# Patient Record
Sex: MALE | Race: OTHER | Employment: FULL TIME | URBAN - METROPOLITAN AREA
[De-identification: names, ages, dates, MRNs, and addresses within clinical notes are randomized per-mention and may not be internally consistent; named-entity substitution may affect disease eponyms.]

---

## 2022-09-08 ENCOUNTER — OFFICE VISIT (OUTPATIENT)
Dept: FAMILY MEDICINE CLINIC | Age: 52
End: 2022-09-08
Payer: COMMERCIAL

## 2022-09-08 ENCOUNTER — HOSPITAL ENCOUNTER (OUTPATIENT)
Dept: LAB | Age: 52
Discharge: HOME | End: 2022-09-08

## 2022-09-08 VITALS
OXYGEN SATURATION: 98 % | HEIGHT: 70 IN | WEIGHT: 199.6 LBS | BODY MASS INDEX: 28.58 KG/M2 | RESPIRATION RATE: 16 BRPM | SYSTOLIC BLOOD PRESSURE: 123 MMHG | HEART RATE: 73 BPM | DIASTOLIC BLOOD PRESSURE: 79 MMHG | TEMPERATURE: 97.9 F

## 2022-09-08 DIAGNOSIS — K76.0 FATTY LIVER: Primary | ICD-10-CM

## 2022-09-08 DIAGNOSIS — R20.2 PARESTHESIA: ICD-10-CM

## 2022-09-08 DIAGNOSIS — Z00.00 ENCOUNTER FOR ROUTINE ADULT HEALTH EXAMINATION WITHOUT ABNORMAL FINDINGS: ICD-10-CM

## 2022-09-08 DIAGNOSIS — R73.03 PREDIABETES: ICD-10-CM

## 2022-09-08 DIAGNOSIS — R42 VERTIGO: ICD-10-CM

## 2022-09-08 DIAGNOSIS — Z76.89 ENCOUNTER TO ESTABLISH CARE: ICD-10-CM

## 2022-09-08 LAB — SENTARA SPECIMEN COL,SENBCF: NORMAL

## 2022-09-08 PROCEDURE — 99204 OFFICE O/P NEW MOD 45 MIN: CPT | Performed by: STUDENT IN AN ORGANIZED HEALTH CARE EDUCATION/TRAINING PROGRAM

## 2022-09-08 PROCEDURE — 99001 SPECIMEN HANDLING PT-LAB: CPT

## 2022-09-08 RX ORDER — ASPIRIN 81 MG/1
81 TABLET ORAL DAILY
Qty: 30 TABLET | Refills: 1 | Status: SHIPPED | OUTPATIENT
Start: 2022-09-08

## 2022-09-08 RX ORDER — MECLIZINE HYDROCHLORIDE 25 MG/1
25 TABLET ORAL
Qty: 30 TABLET | Refills: 0 | Status: SHIPPED | OUTPATIENT
Start: 2022-09-08 | End: 2022-09-18

## 2022-09-08 NOTE — PROGRESS NOTES
Jessica Garcia is a 47 y.o. presents today for   Chief Complaint   Patient presents with    Establish Care    Dizziness     Changes of position for a week      Is someone accompanying this pt? No    Is the patient using any DME equipment during OV? No    Visit Vitals  /79 (BP 1 Location: Left upper arm, BP Patient Position: Sitting, BP Cuff Size: Adult)   Pulse 73   Temp 97.9 °F (36.6 °C) (Temporal)   Resp 16   Ht 5' 9.69\" (1.77 m)   Wt 199 lb 9.6 oz (90.5 kg)   SpO2 98%   BMI 28.90 kg/m²       Depression Screening:   3 most recent PHQ Screens 9/8/2022   Little interest or pleasure in doing things Not at all   Feeling down, depressed, irritable, or hopeless Not at all   Total Score PHQ 2 0       Health Maintenance: reviewed and discussed and ordered per Provider. Health Maintenance Due   Topic Date Due    Hepatitis C Screening  Never done    Depression Screen  Never done    COVID-19 Vaccine (1) Never done    DTaP/Tdap/Td series (1 - Tdap) Never done    Lipid Screen  Never done    Colorectal Cancer Screening Combo  Never done    Shingrix Vaccine Age 50> (1 of 2) Never done    Flu Vaccine (1) Never done         Coordination of Care:   1. \"Have you been to the ER, urgent care clinic since your last visit? Hospitalized since your last visit? \" No    2. \"Have you seen or consulted any other health care providers outside of the [address] [place] [address] since your last visit? \" No     3. For patients aged 39-70: Has the patient had a colonoscopy / FIT/ Cologuard? Yes - no Care Gap present    If the patient is female:    4. For patients aged 41-77: Has the patient had a mammogram within the past 2 years? NA - based on age or sex    11. For patients aged 21-65: Has the patient had a pap smear?  NA - based on age or sex     Edwardo Drummond

## 2022-09-08 NOTE — PROGRESS NOTES
Deya Arthur is a 47 y.o.  male and presents with    Chief Complaint   Patient presents with   [facility] Establish Care    Dizziness     Changes of position for a week            Subjective:      Pt is here to establish care    He has been feeling like the room spins on him when he moves positions. Has been also feeling numbness on his feet b/l. Has been having a HA that are band like around his head for the last week. Pain is intermittent and worst at night. Has been taking Tylenol and seemed to have helped. All sx started about 1 week and were of sudden onset. Denies any acute events in the last week. Denies trauma. There is no problem list on file for this patient. History reviewed. No pertinent past medical history. History reviewed. No pertinent surgical history. Family History   Problem Relation Age of Onset    Diabetes Father      Social History     Socioeconomic History    Marital status:      Spouse name: Not on file    Number of children: Not on file    Years of education: Not on file    Highest education level: Not on file   Occupational History    Not on file   Tobacco Use    Smoking status: Never    Smokeless tobacco: Never   Vaping Use    Vaping Use: Never used   Substance and Sexual Activity    Alcohol use: Yes    Drug use: Never    Sexual activity: Yes     Partners: Female     Birth control/protection: None   Other Topics Concern    Not on file   Social History Narrative    Not on file     Social Determinants of Health     Financial Resource Strain: Not on file   Food Insecurity: Not on file   Transportation Needs: Not on file   Physical Activity: Not on file   Stress: Not on file   Social Connections: Not on file   Intimate Partner Violence: Not on file   Housing Stability: Not on file             ROS   Review of Systems   Constitutional:  Negative for chills, fever and malaise/fatigue. HENT:  Negative for congestion, ear discharge and ear pain.     Eyes: Negative for blurred vision, pain and discharge. Respiratory:  Negative for cough and shortness of breath. Cardiovascular:  Negative for chest pain and palpitations. Gastrointestinal:  Negative for abdominal pain, nausea and vomiting. Genitourinary:  Negative for dysuria, frequency and urgency. Skin:  Negative for itching and rash. Neurological:  Positive for dizziness, sensory change and headaches. Negative for seizures and loss of consciousness. Psychiatric/Behavioral:  Negative for substance abuse. Objective:  Vitals:    09/08/22 1346   BP: 123/79   Pulse: 73   Resp: 16   Temp: 97.9 °F (36.6 °C)   TempSrc: Temporal   SpO2: 98%   Weight: 199 lb 9.6 oz (90.5 kg)   Height: 5' 9.69\" (1.77 m)   PainSc:   0 - No pain       Physical Exam  Vitals reviewed. Constitutional:       General: He is not in acute distress. Appearance: Normal appearance. He is not ill-appearing. HENT:      Nose: Nose normal. No congestion or rhinorrhea. Eyes:      General:         Right eye: No discharge. Left eye: No discharge. Extraocular Movements:      Right eye: Normal extraocular motion and no nystagmus. Left eye: Normal extraocular motion and no nystagmus. Conjunctiva/sclera: Conjunctivae normal.   Cardiovascular:      Rate and Rhythm: Normal rate and regular rhythm. Pulmonary:      Effort: Pulmonary effort is normal.      Breath sounds: Normal breath sounds. Musculoskeletal:      Cervical back: Normal range of motion and neck supple. Neurological:      Mental Status: He is alert and oriented to person, place, and time. Motor: Weakness (RLE) present. Psychiatric:         Mood and Affect: Mood normal.         Behavior: Behavior normal.         Thought Content: Thought content normal.         Judgment: Judgment normal.       LABS     TESTS      Assessment/Plan:    1. Fatty liver  Hx of this    2. Encounter to establish care  Will be pt PCP    3.  Paresthesia  Concern for possible CVA  - METABOLIC PANEL, COMPREHENSIVE; Future  - TSH 3RD GENERATION; Future  - CT HEAD WO CONT; Future    4. Vertigo  Provided pt w/ vertigo exercises. - CT HEAD WO CONT; Future  - meclizine (ANTIVERT) 25 mg tablet; Take 1 Tablet by mouth three (3) times daily as needed for Dizziness for up to 10 days. Dispense: 30 Tablet; Refill: 0    5. Prediabetes  - HEMOGLOBIN A1C WITH EAG; Future    6. Encounter for routine adult health examination without abnormal findings  - LIPID PANEL; Future  - METABOLIC PANEL, COMPREHENSIVE; Future  - TSH 3RD GENERATION; Future  - CBC WITH AUTOMATED DIFF; Future      Lab review: orders written for new lab studies as appropriate; see orders      I have discussed the diagnosis with the patient and the intended plan as seen in the above orders. The patient has received an after-visit summary and questions were answered concerning future plans. I have discussed medication side effects and warnings with the patient as well. I have reviewed the plan of care with the patient, accepted their input and they are in agreement with the treatment goals.          Elena Ovalle MD

## 2022-09-09 LAB
A-G RATIO,AGRAT: 2 RATIO (ref 1.1–2.6)
ABSOLUTE LYMPHOCYTE COUNT, 10803: 2.4 K/UL (ref 1–4.8)
ALBUMIN SERPL-MCNC: 4.7 G/DL (ref 3.5–5)
ALP SERPL-CCNC: 69 U/L (ref 25–115)
ALT SERPL-CCNC: 37 U/L (ref 5–40)
ANION GAP SERPL CALC-SCNC: 12 MMOL/L (ref 3–15)
AST SERPL W P-5'-P-CCNC: 25 U/L (ref 10–37)
AVG GLU, 10930: 157 MG/DL (ref 91–123)
BASOPHILS # BLD: 0 K/UL (ref 0–0.2)
BASOPHILS NFR BLD: 1 % (ref 0–2)
BILIRUB SERPL-MCNC: 0.5 MG/DL (ref 0.2–1.2)
BUN SERPL-MCNC: 12 MG/DL (ref 6–22)
CALCIUM SERPL-MCNC: 9.9 MG/DL (ref 8.4–10.5)
CHLORIDE SERPL-SCNC: 104 MMOL/L (ref 98–110)
CHOLEST SERPL-MCNC: 233 MG/DL (ref 110–200)
CO2 SERPL-SCNC: 25 MMOL/L (ref 20–32)
CREAT SERPL-MCNC: 0.8 MG/DL (ref 0.5–1.2)
EOSINOPHIL # BLD: 0.1 K/UL (ref 0–0.5)
EOSINOPHIL NFR BLD: 2 % (ref 0–6)
ERYTHROCYTE [DISTWIDTH] IN BLOOD BY AUTOMATED COUNT: 13.6 % (ref 10–15.5)
GLOBULIN,GLOB: 2.4 G/DL (ref 2–4)
GLOMERULAR FILTRATION RATE: >60 ML/MIN/1.73 SQ.M.
GLUCOSE SERPL-MCNC: 173 MG/DL (ref 70–99)
GRANULOCYTES,GRANS: 57 % (ref 40–75)
HBA1C MFR BLD HPLC: 7.1 % (ref 4.8–5.6)
HCT VFR BLD AUTO: 47.3 % (ref 39.3–51.6)
HDLC SERPL-MCNC: 47 MG/DL
HDLC SERPL-MCNC: 5 MG/DL (ref 0–5)
HGB BLD-MCNC: 15.5 G/DL (ref 13.1–17.2)
LDL/HDL RATIO,LDHD: 2.4
LDLC SERPL CALC-MCNC: 114 MG/DL (ref 50–99)
LYMPHOCYTES, LYMLT: 33 % (ref 20–45)
MCH RBC QN AUTO: 31 PG (ref 26–34)
MCHC RBC AUTO-ENTMCNC: 33 G/DL (ref 31–36)
MCV RBC AUTO: 94 FL (ref 80–95)
MONOCYTES # BLD: 0.5 K/UL (ref 0.1–1)
MONOCYTES NFR BLD: 7 % (ref 3–12)
NEUTROPHILS # BLD AUTO: 4.1 K/UL (ref 1.8–7.7)
NON-HDL CHOLESTEROL, 011976: 186 MG/DL
PLATELET # BLD AUTO: 201 K/UL (ref 140–440)
PMV BLD AUTO: 10.4 FL (ref 9–13)
POTASSIUM SERPL-SCNC: 4.1 MMOL/L (ref 3.5–5.5)
PROT SERPL-MCNC: 7.1 G/DL (ref 6.4–8.3)
RBC # BLD AUTO: 5.05 M/UL (ref 3.8–5.8)
SODIUM SERPL-SCNC: 141 MMOL/L (ref 133–145)
TRIGL SERPL-MCNC: 361 MG/DL (ref 40–149)
TSH SERPL DL<=0.005 MIU/L-ACNC: 0.72 MCU/ML (ref 0.27–4.2)
VLDLC SERPL CALC-MCNC: 72 MG/DL (ref 8–30)
WBC # BLD AUTO: 7.2 K/UL (ref 4–11)

## 2022-09-23 ENCOUNTER — HOSPITAL ENCOUNTER (OUTPATIENT)
Dept: CT IMAGING | Age: 52
Discharge: HOME | End: 2022-09-23
Attending: STUDENT IN AN ORGANIZED HEALTH CARE EDUCATION/TRAINING PROGRAM
Payer: COMMERCIAL

## 2022-09-23 DIAGNOSIS — R42 VERTIGO: ICD-10-CM

## 2022-09-23 DIAGNOSIS — R20.2 PARESTHESIA: ICD-10-CM

## 2022-09-23 PROCEDURE — 70450 CT HEAD/BRAIN W/O DYE: CPT

## 2022-09-26 ENCOUNTER — TELEPHONE (OUTPATIENT)
Dept: FAMILY MEDICINE CLINIC | Age: 52
End: 2022-09-26

## 2022-09-26 NOTE — TELEPHONE ENCOUNTER
Pt's wife, Cassandra Mccarty, called regarding pt's symptoms. She stated that her  has gotten worse with complains of dizziness, fatigue and headaches. Symptoms have not improved with medications. He had his CT head completed last week. Cassandra Mccarty requested to schedule appt asap or a call from provider. Pt's wife was advised that in case symptoms worse headaches, altered mental status or motor deficit he needs to be evaluated in the ER. Celia verbalized understanding.

## 2022-09-28 ENCOUNTER — OFFICE VISIT (OUTPATIENT)
Dept: FAMILY MEDICINE CLINIC | Age: 52
End: 2022-09-28
Payer: COMMERCIAL

## 2022-09-28 VITALS
DIASTOLIC BLOOD PRESSURE: 80 MMHG | RESPIRATION RATE: 16 BRPM | TEMPERATURE: 98 F | WEIGHT: 199.2 LBS | SYSTOLIC BLOOD PRESSURE: 122 MMHG | HEART RATE: 82 BPM | HEIGHT: 70 IN | BODY MASS INDEX: 28.52 KG/M2

## 2022-09-28 DIAGNOSIS — E78.49 OTHER HYPERLIPIDEMIA: ICD-10-CM

## 2022-09-28 DIAGNOSIS — Z11.59 NEED FOR HEPATITIS C SCREENING TEST: ICD-10-CM

## 2022-09-28 DIAGNOSIS — E11.42 CONTROLLED TYPE 2 DIABETES MELLITUS WITH DIABETIC POLYNEUROPATHY, WITHOUT LONG-TERM CURRENT USE OF INSULIN (HCC): ICD-10-CM

## 2022-09-28 DIAGNOSIS — Z86.73 HISTORY OF CVA (CEREBROVASCULAR ACCIDENT): Primary | ICD-10-CM

## 2022-09-28 DIAGNOSIS — R42 VERTIGO: ICD-10-CM

## 2022-09-28 PROCEDURE — 99214 OFFICE O/P EST MOD 30 MIN: CPT | Performed by: STUDENT IN AN ORGANIZED HEALTH CARE EDUCATION/TRAINING PROGRAM

## 2022-09-28 PROCEDURE — 3051F HG A1C>EQUAL 7.0%<8.0%: CPT | Performed by: STUDENT IN AN ORGANIZED HEALTH CARE EDUCATION/TRAINING PROGRAM

## 2022-09-28 RX ORDER — ATORVASTATIN CALCIUM 40 MG/1
80 TABLET, FILM COATED ORAL DAILY
Qty: 90 TABLET | Refills: 1 | Status: SHIPPED | OUTPATIENT
Start: 2022-09-28

## 2022-09-28 RX ORDER — MECLIZINE HYDROCHLORIDE 25 MG/1
25 TABLET ORAL
Qty: 30 TABLET | Refills: 1 | Status: SHIPPED | OUTPATIENT
Start: 2022-09-28 | End: 2022-10-08

## 2022-09-28 RX ORDER — METFORMIN HYDROCHLORIDE 500 MG/1
500 TABLET, EXTENDED RELEASE ORAL
Qty: 90 TABLET | Refills: 1 | Status: SHIPPED | OUTPATIENT
Start: 2022-09-28

## 2022-09-28 NOTE — PROGRESS NOTES
Elena Phillips is a 47 y.o. presents today for   Chief Complaint   Patient presents with    Dizziness     Ct done last week      Is someone accompanying this pt? No    Is the patient using any DME equipment during OV? No    Visit Vitals  /80 (BP 1 Location: Left upper arm, BP Patient Position: Sitting, BP Cuff Size: Adult)   Pulse 82   Temp 98 °F (36.7 °C) (Temporal)   Resp 16   Ht 5' 9.69\" (1.77 m)   Wt 199 lb 3.2 oz (90.4 kg)   BMI 28.84 kg/m²       Depression Screening:   3 most recent PHQ Screens 9/28/2022   Little interest or pleasure in doing things Not at all   Feeling down, depressed, irritable, or hopeless Not at all   Total Score PHQ 2 0       Health Maintenance: reviewed and discussed and ordered per Provider. Health Maintenance Due   Topic Date Due    Hepatitis C Screening  Never done    COVID-19 Vaccine (1) Never done    DTaP/Tdap/Td series (1 - Tdap) Never done    Colorectal Cancer Screening Combo  Never done    Shingrix Vaccine Age 50> (1 of 2) Never done    Flu Vaccine (1) Never done         Coordination of Care:   1. \"Have you been to the ER, urgent care clinic since your last visit? Hospitalized since your last visit? \" No    2. \"Have you seen or consulted any other health care providers outside of the [address] [place] [address] since your last visit? \" No     3. For patients aged 39-70: Has the patient had a colonoscopy / FIT/ Cologuard? No    If the patient is female:    4. For patients aged 41-77: Has the patient had a mammogram within the past 2 years? NA - based on age or sex    11. For patients aged 21-65: Has the patient had a pap smear?  NA - based on age or sex     Diego Hurd

## 2022-09-29 NOTE — PROGRESS NOTES
Octavio De Souza is a 47 y.o.  male and presents with    Chief Complaint   Patient presents with    Dizziness     Ct done last week            Subjective:    Pt states he continues to have the feeling of dizziness and heaviness of his head. He states that there was a point where he stood up and he has been what he describes as vertigo. He feels the symptoms are not getting any better. Patient also states that he continues to have tingling sensation bilaterally on his feet. He is here today to follow-up on the CT scan that was done last week and on the blood work. Has been taking his aspirin on a daily basis. There is no problem list on file for this patient. History reviewed. No pertinent past medical history. History reviewed. No pertinent surgical history. Family History   Problem Relation Age of Onset    Diabetes Father      Social History     Socioeconomic History    Marital status:      Spouse name: Not on file    Number of children: Not on file    Years of education: Not on file    Highest education level: Not on file   Occupational History    Not on file   Tobacco Use    Smoking status: Never    Smokeless tobacco: Never   Vaping Use    Vaping Use: Never used   Substance and Sexual Activity    Alcohol use: Yes    Drug use: Never    Sexual activity: Yes     Partners: Female     Birth control/protection: None   Other Topics Concern    Not on file   Social History Narrative    Not on file     Social Determinants of Health     Financial Resource Strain: Not on file   Food Insecurity: Not on file   Transportation Needs: Not on file   Physical Activity: Not on file   Stress: Not on file   Social Connections: Not on file   Intimate Partner Violence: Not on file   Housing Stability: Not on file        Current Outpatient Medications   Medication Sig Dispense Refill    atorvastatin (LIPITOR) 40 mg tablet Take 2 Tablets by mouth daily.  90 Tablet 1    metFORMIN ER (GLUCOPHAGE XR) 500 mg tablet Take 1 Tablet by mouth daily (with dinner). 90 Tablet 1    meclizine (ANTIVERT) 25 mg tablet Take 1 Tablet by mouth three (3) times daily as needed for Dizziness for up to 10 days. 30 Tablet 1    aspirin delayed-release 81 mg tablet Take 1 Tablet by mouth daily. 30 Tablet 1        ROS   Review of Systems   Constitutional:  Negative for chills, fever and malaise/fatigue. HENT:  Negative for congestion, ear discharge and ear pain. Eyes:  Negative for blurred vision, pain and discharge. Respiratory:  Negative for cough and shortness of breath. Cardiovascular:  Negative for chest pain and palpitations. Gastrointestinal:  Negative for abdominal pain, nausea and vomiting. Genitourinary:  Negative for dysuria, frequency and urgency. Skin:  Negative for itching and rash. Neurological:  Negative for dizziness, seizures, loss of consciousness and headaches. Psychiatric/Behavioral:  Negative for substance abuse. Objective:  Vitals:    09/28/22 1307   BP: 122/80   Pulse: 82   Resp: 16   Temp: 98 °F (36.7 °C)   TempSrc: Temporal   Weight: 199 lb 3.2 oz (90.4 kg)   Height: 5' 9.69\" (1.77 m)   PainSc:   0 - No pain       Physical Exam  Vitals reviewed. Constitutional:       General: He is not in acute distress. Appearance: Normal appearance. He is not ill-appearing. HENT:      Nose: Nose normal. No congestion or rhinorrhea. Eyes:      General:         Right eye: No discharge. Left eye: No discharge. Conjunctiva/sclera: Conjunctivae normal.   Neck:      Vascular: No carotid bruit. Cardiovascular:      Rate and Rhythm: Normal rate and regular rhythm. Pulmonary:      Effort: Pulmonary effort is normal.      Breath sounds: Normal breath sounds. Musculoskeletal:      Cervical back: Normal range of motion and neck supple. Neurological:      Mental Status: He is alert and oriented to person, place, and time.    Psychiatric:         Mood and Affect: Mood normal.         Behavior: Behavior normal.         Thought Content: Thought content normal.         Judgment: Judgment normal.         LABS     TESTS  EXAM: CT HEAD WO CONT     CLINICAL INDICATION/HISTORY: Vertigo     COMPARISON: None     FINDINGS:     Soft tissues: No significant findings. Skull base/calvarium: Unremarkable. Brain: There is a small well marginated defect in the left cerebellar hemisphere  this is seen on axial image 10  Defect measures approximately 5 x 5 mm  Findings are consistent with a small remote ischemic injury  Ventricles and cisterns: Unremarkable for age. Orbits: Unremarkable. Paranasal Sinuses: Clear   Other findings: None     IMPRESSION  1. Exam remarkable for small old left cerebellar ischemic injury       Assessment/Plan:    1. History of CVA (cerebrovascular accident)  Pt had never been dx w/ a CVA in the past  - atorvastatin (LIPITOR) 40 mg tablet; Take 2 Tablets by mouth daily. Dispense: 90 Tablet; Refill: 1  - REFERRAL TO NEUROLOGY  - CTA NECK; Future  - ECHO ADULT COMPLETE; Future    2. Controlled type 2 diabetes mellitus with diabetic polyneuropathy, without long-term current use of insulin (HCC)  New dx  - atorvastatin (LIPITOR) 40 mg tablet; Take 2 Tablets by mouth daily. Dispense: 90 Tablet; Refill: 1  - metFORMIN ER (GLUCOPHAGE XR) 500 mg tablet; Take 1 Tablet by mouth daily (with dinner). Dispense: 90 Tablet; Refill: 1    3. Other hyperlipidemia  - atorvastatin (LIPITOR) 40 mg tablet; Take 2 Tablets by mouth daily. Dispense: 90 Tablet; Refill: 1    4. Need for hepatitis C screening test  - HEPATITIS C AB; Future    5. Vertigo  - REFERRAL TO NEUROLOGY  - meclizine (ANTIVERT) 25 mg tablet; Take 1 Tablet by mouth three (3) times daily as needed for Dizziness for up to 10 days. Dispense: 30 Tablet; Refill: 1  - CTA NECK;  Future  - ECHO ADULT COMPLETE; Future       Lab review: labs reviewed, I note that glycosylated hemoglobin diagnostic for diabetes      I have discussed the diagnosis with the patient and the intended plan as seen in the above orders. The patient has received an after-visit summary and questions were answered concerning future plans. I have discussed medication side effects and warnings with the patient as well. I have reviewed the plan of care with the patient, accepted their input and they are in agreement with the treatment goals.          Montez Mendieta MD

## 2022-10-04 ENCOUNTER — TELEPHONE (OUTPATIENT)
Dept: FAMILY MEDICINE CLINIC | Age: 52
End: 2022-10-04

## 2022-10-04 NOTE — TELEPHONE ENCOUNTER
Patients wife called about . Unable to understand patient due to language barrier. Please call wife at [phone]. Thank you.

## 2022-10-07 ENCOUNTER — OFFICE VISIT (OUTPATIENT)
Dept: FAMILY MEDICINE CLINIC | Age: 52
End: 2022-10-07
Payer: COMMERCIAL

## 2022-10-07 VITALS
RESPIRATION RATE: 16 BRPM | DIASTOLIC BLOOD PRESSURE: 78 MMHG | BODY MASS INDEX: 27.8 KG/M2 | TEMPERATURE: 97.8 F | HEIGHT: 70 IN | WEIGHT: 194.2 LBS | OXYGEN SATURATION: 98 % | HEART RATE: 72 BPM | SYSTOLIC BLOOD PRESSURE: 114 MMHG

## 2022-10-07 DIAGNOSIS — Z86.73 HISTORY OF CVA (CEREBROVASCULAR ACCIDENT): Primary | ICD-10-CM

## 2022-10-07 DIAGNOSIS — R42 VERTIGO: ICD-10-CM

## 2022-10-07 PROCEDURE — 99214 OFFICE O/P EST MOD 30 MIN: CPT | Performed by: STUDENT IN AN ORGANIZED HEALTH CARE EDUCATION/TRAINING PROGRAM

## 2022-10-07 NOTE — PROGRESS NOTES
Agnes Quintero is a 47 y.o. presents today for   Chief Complaint   Patient presents with    Headache    Dizziness    Results     Ct scan      Is someone accompanying this pt? wife    Is the patient using any DME equipment during OV? No    Depression Screening:   3 most recent PHQ Screens 10/7/2022   Little interest or pleasure in doing things Not at all   Feeling down, depressed, irritable, or hopeless Not at all   Total Score PHQ 2 0       Health Maintenance: reviewed and discussed and ordered per Provider. Health Maintenance Due   Topic Date Due    Hepatitis C Screening  Never done    COVID-19 Vaccine (1) Never done    Pneumococcal 0-64 years (1 - PCV) Never done    DTaP/Tdap/Td series (1 - Tdap) Never done    Colorectal Cancer Screening Combo  Never done    Shingrix Vaccine Age 50> (1 of 2) Never done    Flu Vaccine (1) Never done         Coordination of Care:   1. \"Have you been to the ER, urgent care clinic since your last visit? Hospitalized since your last visit? \" Yes [facility] ER yesterday     2. \"Have you seen or consulted any other health care providers outside of the [address] [place] [address] since your last visit? \" No     3. For patients aged 39-70: Has the patient had a colonoscopy / FIT/ Cologuard? No    If the patient is female:    4. For patients aged 41-77: Has the patient had a mammogram within the past 2 years? NA - based on age or sex    11. For patients aged 21-65: Has the patient had a pap smear?  NA - based on age or sex     [address] [place] [address]

## 2022-10-07 NOTE — PROGRESS NOTES
Kayleen Reyna is a 47 y.o.  male and presents with    Chief Complaint   Patient presents with    Headache    Dizziness    Results     Ct scan            Subjective:    Patient states that he continues to have that feeling of heaviness in his head. Yesterday had to go to the emergency room after having being in experience of significant dizziness. Upon further discussion with patient, and with wife was in the room today. Patient states that he has been feeling his tunnel like if he had bubbles in the top part of his scalp. He states when he gets a massage in that area is when everything starts to feel little bit better. Patient has CTA done yesterday due to CVA. He is here to follow-up on the results. Patient still has not had echocardiogram done yet. There is no problem list on file for this patient. History reviewed. No pertinent past medical history. History reviewed. No pertinent surgical history.    Family History   Problem Relation Age of Onset    Diabetes Father      Social History     Socioeconomic History    Marital status:      Spouse name: Not on file    Number of children: Not on file    Years of education: Not on file    Highest education level: Not on file   Occupational History    Not on file   Tobacco Use    Smoking status: Never    Smokeless tobacco: Never   Vaping Use    Vaping Use: Never used   Substance and Sexual Activity    Alcohol use: Yes    Drug use: Never    Sexual activity: Yes     Partners: Female     Birth control/protection: None   Other Topics Concern    Not on file   Social History Narrative    Not on file     Social Determinants of Health     Financial Resource Strain: Not on file   Food Insecurity: Not on file   Transportation Needs: Not on file   Physical Activity: Not on file   Stress: Not on file   Social Connections: Not on file   Intimate Partner Violence: Not on file   Housing Stability: Not on file        Current Outpatient Medications Medication Sig Dispense Refill    atorvastatin (LIPITOR) 40 mg tablet Take 2 Tablets by mouth daily. 90 Tablet 1    metFORMIN ER (GLUCOPHAGE XR) 500 mg tablet Take 1 Tablet by mouth daily (with dinner). 90 Tablet 1    meclizine (ANTIVERT) 25 mg tablet Take 1 Tablet by mouth three (3) times daily as needed for Dizziness for up to 10 days. 30 Tablet 1    aspirin delayed-release 81 mg tablet Take 1 Tablet by mouth daily. 30 Tablet 1        ROS   Review of Systems   Constitutional:  Negative for chills, fever and malaise/fatigue. HENT:  Negative for congestion, ear discharge and ear pain. Eyes:  Negative for blurred vision, pain and discharge. Respiratory:  Negative for cough and shortness of breath. Cardiovascular:  Negative for chest pain and palpitations. Gastrointestinal:  Negative for abdominal pain, nausea and vomiting. Genitourinary:  Negative for dysuria, frequency and urgency. Skin:  Negative for itching and rash. Neurological:  Negative for dizziness, seizures, loss of consciousness and headaches. Psychiatric/Behavioral:  Negative for substance abuse. Objective:  Vitals:    10/07/22 0841   BP: 114/78   Pulse: 72   Resp: 16   Temp: 97.8 °F (36.6 °C)   TempSrc: Temporal   SpO2: 98%   Weight: 194 lb 3.2 oz (88.1 kg)   Height: 5' 9.69\" (1.77 m)   PainSc:   0 - No pain       Physical Exam  Vitals reviewed. Constitutional:       General: He is not in acute distress. Appearance: Normal appearance. He is not ill-appearing. HENT:      Nose: Nose normal. No congestion or rhinorrhea. Eyes:      General:         Right eye: No discharge. Left eye: No discharge. Extraocular Movements: Extraocular movements intact. Conjunctiva/sclera: Conjunctivae normal.      Pupils: Pupils are equal, round, and reactive to light. Cardiovascular:      Rate and Rhythm: Normal rate and regular rhythm.    Pulmonary:      Effort: Pulmonary effort is normal.      Breath sounds: Normal breath sounds. No stridor. Musculoskeletal:      Cervical back: Normal range of motion and neck supple. Neurological:      Mental Status: He is alert and oriented to person, place, and time. Psychiatric:         Mood and Affect: Mood normal.         Behavior: Behavior normal.         Thought Content: Thought content normal.         Judgment: Judgment normal.         LABS     TESTS      Assessment/Plan:    1. History of CVA (cerebrovascular accident)  CTA was WNL  Provided number of neurology that he was referred to so he can get an appt    2. Vertigo  Pt had meclizine prescribed to him previously. Provided vertigo exercises    Over 30 mins were spent on appt and medical management. Lab review: no lab studies available for review at time of visit      I have discussed the diagnosis with the patient and the intended plan as seen in the above orders. The patient has received an after-visit summary and questions were answered concerning future plans. I have discussed medication side effects and warnings with the patient as well. I have reviewed the plan of care with the patient, accepted their input and they are in agreement with the treatment goals.          Camille Phillips MD

## 2023-03-01 LAB — HBA1C MFR BLD HPLC: 7.5 %
